# Patient Record
Sex: FEMALE | Race: WHITE | NOT HISPANIC OR LATINO | Employment: UNEMPLOYED | URBAN - METROPOLITAN AREA
[De-identification: names, ages, dates, MRNs, and addresses within clinical notes are randomized per-mention and may not be internally consistent; named-entity substitution may affect disease eponyms.]

---

## 2024-07-03 ENCOUNTER — OFFICE VISIT (OUTPATIENT)
Dept: URGENT CARE | Facility: CLINIC | Age: 39
End: 2024-07-03
Payer: COMMERCIAL

## 2024-07-03 VITALS
HEART RATE: 76 BPM | DIASTOLIC BLOOD PRESSURE: 88 MMHG | WEIGHT: 239 LBS | OXYGEN SATURATION: 98 % | SYSTOLIC BLOOD PRESSURE: 137 MMHG

## 2024-07-03 DIAGNOSIS — L30.9 ACUTE DERMATITIS: Primary | ICD-10-CM

## 2024-07-03 DIAGNOSIS — R60.0 EDEMA OF EXTREMITIES: ICD-10-CM

## 2024-07-03 DIAGNOSIS — M32.9 LUPUS (MULTI): ICD-10-CM

## 2024-07-03 DIAGNOSIS — R60.9 WATER RETENTION: ICD-10-CM

## 2024-07-03 PROBLEM — O09.93 HIGH-RISK PREGNANCY IN THIRD TRIMESTER (HHS-HCC): Status: RESOLVED | Noted: 2019-03-25 | Resolved: 2024-07-03

## 2024-07-03 PROBLEM — Z98.891 STATUS POST PRIMARY LOW TRANSVERSE CESAREAN SECTION: Status: RESOLVED | Noted: 2019-05-25 | Resolved: 2024-07-03

## 2024-07-03 PROBLEM — B18.2 CHRONIC HEPATITIS C VIRUS INFECTION (MULTI): Status: ACTIVE | Noted: 2019-03-25

## 2024-07-03 PROCEDURE — 99204 OFFICE O/P NEW MOD 45 MIN: CPT

## 2024-07-03 RX ORDER — FUROSEMIDE 20 MG/1
20 TABLET ORAL
Qty: 20 TABLET | Refills: 0 | Status: SHIPPED | OUTPATIENT
Start: 2024-07-03 | End: 2024-07-13

## 2024-07-03 RX ORDER — TRIAMCINOLONE ACETONIDE 1 MG/G
OINTMENT TOPICAL 2 TIMES DAILY
Qty: 30 G | Refills: 0 | Status: SHIPPED | OUTPATIENT
Start: 2024-07-03 | End: 2024-07-13

## 2024-07-03 RX ORDER — PREDNISONE 10 MG/1
TABLET ORAL 2 TIMES DAILY
Qty: 15 TABLET | Refills: 0 | Status: SHIPPED | OUTPATIENT
Start: 2024-07-03 | End: 2024-07-11

## 2024-07-03 ASSESSMENT — ENCOUNTER SYMPTOMS
NAUSEA: 0
TROUBLE SWALLOWING: 0
SORE THROAT: 0
DIZZINESS: 0
HEADACHES: 0
MUSCULOSKELETAL NEGATIVE: 1
VOICE CHANGE: 0
SHORTNESS OF BREATH: 1
ARTHRALGIAS: 0
ABDOMINAL PAIN: 0
NEUROLOGICAL NEGATIVE: 1
CONSTITUTIONAL NEGATIVE: 1
FACIAL SWELLING: 1
WEAKNESS: 0
CHILLS: 0
GASTROINTESTINAL NEGATIVE: 1
PALPITATIONS: 0
FEVER: 0
MYALGIAS: 0
RHINORRHEA: 0
COLOR CHANGE: 1
VOMITING: 0
DIARRHEA: 0
DIAPHORESIS: 0
COUGH: 0
FATIGUE: 0

## 2024-07-03 NOTE — PROGRESS NOTES
Subjective   History  Nery Barros is a 39 y.o. female who presents for Rash.    Patient presents with rash over her face and upper chest worsening over the last 2 days. She reports that she has also noticed some swelling and redness of her fingers and swelling of her whole legs up through her buttocks causing feeling of tightness in the legs and pain with trouble bending the legs. She reports that she has had this swelling in the past and was hospitalized for it, and it is worse this time since it is up her buttocks. She reports that she believed she was around 225lbs, so 240 was surprising, but doesn't weigh herself enough to know for sure over what length of time she may have gained weight. She states that she has noticed increased SOB with exertion, but no sore throat/swelling or any chest/throat tightness. Patient reports that she does have lupus, but has never had a rash with it, so she does not think that this is related.      History provided by:  Patient and medical records  Rash  Associated symptoms include shortness of breath. Pertinent negatives include no congestion, cough, diarrhea, fatigue, fever, rhinorrhea, sore throat or vomiting.       No past surgical history on file.  Social History     Tobacco Use    Smoking status: Not on file    Smokeless tobacco: Not on file   Substance Use Topics    Alcohol use: Not on file    Drug use: Not on file       Review of Systems   Constitutional:  Positive for unexpected weight change. Negative for chills, diaphoresis, fatigue and fever.   HENT:  Positive for facial swelling. Negative for congestion, ear pain, mouth sores, rhinorrhea, sinus pressure, sore throat, trouble swallowing and voice change.    Respiratory:  Positive for shortness of breath. Negative for cough, chest tightness, wheezing and stridor.    Cardiovascular:  Positive for leg swelling. Negative for chest pain and palpitations.   Gastrointestinal: Negative.  Negative for abdominal pain, diarrhea,  nausea and vomiting.   Musculoskeletal:  Positive for back pain and gait problem. Negative for arthralgias and myalgias.   Skin:  Positive for color change and rash. Negative for wound.   Neurological:  Negative for dizziness, weakness, light-headedness, numbness and headaches.       Objective   Vital Signs  /88   Pulse 76   Wt 108 kg (239 lb)   LMP  (LMP Unknown)   SpO2 98%    All vitals have been reviewed and are stable.    Diagnostic Results    No results found for this or any previous visit (from the past 24 hour(s)).     Physical Exam  Physical Exam  Vitals and nursing note reviewed.   Constitutional:       General: She is awake. She is not in acute distress.     Appearance: Normal appearance. She is well-developed. She is obese. She is not ill-appearing, toxic-appearing or diaphoretic.   HENT:      Head: Normocephalic and atraumatic. No right periorbital erythema or left periorbital erythema.      Jaw: There is normal jaw occlusion.      Salivary Glands: Right salivary gland is not diffusely enlarged. Left salivary gland is not diffusely enlarged.      Comments: Bilateral cheek swelling and erythema      Right Ear: Tympanic membrane, ear canal and external ear normal.      Left Ear: Tympanic membrane, ear canal and external ear normal.      Nose: Nose normal. No congestion or rhinorrhea.      Mouth/Throat:      Lips: Pink. No lesions.      Mouth: Mucous membranes are moist. No oral lesions or angioedema.      Pharynx: Oropharynx is clear. Uvula midline. No posterior oropharyngeal erythema, uvula swelling or postnasal drip.   Eyes:      General: Lids are normal. Vision grossly intact. Gaze aligned appropriately.      Extraocular Movements: Extraocular movements intact.      Conjunctiva/sclera: Conjunctivae normal.      Right eye: Right conjunctiva is not injected.      Left eye: Left conjunctiva is not injected.      Pupils: Pupils are equal, round, and reactive to light.   Cardiovascular:      Rate  and Rhythm: Normal rate and regular rhythm.      Pulses: Normal pulses.      Heart sounds: Normal heart sounds, S1 normal and S2 normal.   Pulmonary:      Effort: Pulmonary effort is normal. No tachypnea, accessory muscle usage or respiratory distress.      Breath sounds: Normal air entry. No stridor or transmitted upper airway sounds. Rales present. No wheezing or rhonchi.   Chest:      Chest wall: No swelling or tenderness.   Abdominal:      General: Abdomen is flat. There is no distension.      Palpations: Abdomen is soft.   Musculoskeletal:         General: No swelling or deformity.      Cervical back: Normal, full passive range of motion without pain, normal range of motion and neck supple. No rigidity. Normal range of motion.      Thoracic back: Normal. No swelling or tenderness. Normal range of motion.      Lumbar back: Swelling (soft tissue), edema and tenderness present. No bony tenderness. Decreased range of motion.      Right lower le+ Pitting Edema present.      Left lower le+ Pitting Edema present.   Lymphadenopathy:      Cervical: No cervical adenopathy.   Skin:     General: Skin is warm and dry.      Findings: Erythema (face and chest) and rash present. No abscess, petechiae or wound. Rash is macular and papular. Rash is not crusting, nodular, pustular, urticarial or vesicular.   Neurological:      General: No focal deficit present.      Mental Status: She is alert and oriented to person, place, and time. Mental status is at baseline.      Motor: Motor function is intact.      Coordination: Coordination is intact.   Psychiatric:         Mood and Affect: Mood and affect normal.         Speech: Speech normal.         Behavior: Behavior normal. Behavior is cooperative.         Thought Content: Thought content normal.         Judgment: Judgment normal.         Assessment/Plan   Procedures   N/A    Problem List Items Addressed This Visit       Lupus (Multi)       - Prednisone 10mg taper     Other  Visit Diagnoses       Acute dermatitis    -  Primary       - Prednisone 10mg taper - discussed that this could worsen swelling/water retention, but benefit>risk with facial rash and swelling       - Triamcinolone 0.1% ointment - not to be used near eyes    Water retention        Edema of extremities            - Furosemide 20mg             [facility] Course  MDM    Patient disposition: Home    Red flags for reporting to ER have been reviewed with the patient.    Current diagnosis, any medication changes, and all in-office lab or radiologic results have been reviewed with the patient at the time of the visit.   If symptoms do not improve or worsen, patient is to follow up with PCP or report to the emergency room.   Patient is alert and oriented x3 and non-toxic appearing. Vital signs are stable.   Patient and/or guardian has sufficient decision-making capabilities at this time and reports understanding and agreement with the treatment plan made through shared decision-making.

## 2024-07-12 ASSESSMENT — ENCOUNTER SYMPTOMS
BACK PAIN: 1
SINUS PRESSURE: 0
CHEST TIGHTNESS: 0
UNEXPECTED WEIGHT CHANGE: 1
WHEEZING: 0
WOUND: 0
NUMBNESS: 0
STRIDOR: 0
LIGHT-HEADEDNESS: 0

## 2024-07-17 ASSESSMENT — VISUAL ACUITY: OU: 1

## 2024-07-17 NOTE — PATIENT INSTRUCTIONS
ACUTE DERMATITIS      - PREDNISONE oral steroid taper has been prescribed for reducing inflammation and skin reaction   - Put TRIAMCINOLONE on the red itchy areas / the affected area as needed.     - Take Benadryl Allergy 25 mg 1 pill every 4 hours as needed for severe swelling or itching.   - You can also try calamine lotion, Benadryl cream, or oatmeal baths     - Avoid very hot showers as this opens the pores and drys out the skin making it more sensitive to irritants.   - Use of fragrance-free and dye-free products may be necessary for products that touch the skin (free & clear laundry detergents, body soaps and lotions -CeraVe and Eucerin are recommended, and no perfumes)   - Many wall plug-in scents seem to cause irritated skin in people who have more sensitive skin. You may need to stop use of these or scented candles to test if you skin/rash improves   - Recommend to do a spot test on forearm of all new products to ensure no reaction occurs before full use.     EDEMA      - FUROSEMIDE (LASIX) 10 days has been prescribed to help reduce fluid on legs and lungs   - Patient declined any bloodwork orders at this time   - Compression stockings recommended to be worn all day to reduce swelling of lower legs - may be picked up at a medical supply store like [employer] ([address] [place] [address])   - Recommended decreased dietary salt intake and keeping legs elevated above the level of the heart as long as possible each day     - Discussed potential causes of fluid accumulation and consequences of continuing to allow fluid build-up    - Recommend follow up with PCP in the next 1-2 weeks to check status and make any other changes.     Have someone stay with you until you feel stable. Do not drive, operate machinery, or play sports until your caregiver says it is okay.   Keep all follow-up appointments as directed by your caregiver.     Lie down right away if you start feeling like you might faint.  Breathe deeply and steadily. Wait until all the symptoms have passed.  If you are taking blood pressure or heart medicine, get up slowly, taking several minutes to sit and then stand. This can reduce dizziness.     SEEK IMMEDIATE MEDICAL CARE IF: You have a severe headache. You have unusual pain in the chest, abdomen, or back. You are bleeding from the mouth or rectum, or you have a black or tarry stool. You have an irregular or very fast heartbeat. You have pain with breathing. You have repeated fainting or seizure-like jerking during an episode. You faint when sitting or lying down. You have confusion. You have difficulty walking. You have severe weakness. You have vision problems. If you fainted, call your local emergency services - do not drive yourself to the hospital.